# Patient Record
(demographics unavailable — no encounter records)

---

## 2024-10-21 NOTE — HISTORY OF PRESENT ILLNESS
[FreeTextEntry1] : PT with CTA : CAC: 37, minimal LAD dz, hfpef, DD2, mr, hld, mina on cpap, erectile dysfunction, IBS, prostate cancer to bone on lupron in past.  Ashtma, AORTIC ATHEROSCLEROSIS CT SCAN . 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   23:  23: CAROTID: B/L ICA < 50%.  10 year ASCVD: 17.5%  Patient denies cp, syncope or palpitations. Patient c/o sob going up stairs but has asthma and anxiety. Patient c/o dizziness with postural changes.  pt f/u for prostate CA and patient is depressed at times. pt has aortic atherosclerosis. pt having stress from wife. pt says at times has palpiations.  Sodium: 145 watch salt,  T  bnp: 48   24: Patient called office stating HR has been in 50s and isnt sure if he should take metoprolol 100mg for CTA. NP advised patient to half metoprolol to 50mg for CTA.   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 pt says sometimes if active feels hr faster but not bothersome.  pt did not to bloodwork.  pt did not start cholesterol rosuvastatin 10 mg po qhs.    10/10/24: EF: 68%, LVMI: 93 g/m2, ivsd/lvpw: 1.1 cm, GLS: -22.5%, E/e': 10 lvot vti: 29.4 cm. SoV: 3.9 cm,. borderline LVH, trace AR 10/21/24: Patient denies cp, sob, dizziness/syncope, or palpitations. The patient is getting blood work done today and states did not start Rosuvastatin.

## 2024-10-21 NOTE — DISCUSSION/SUMMARY
[FreeTextEntry1] : aortic atherosclerosis 2022  mild CAD  start Rosuvastatin 10 mg po qhs  start baby aspirin 81mg daily  pt with high sodium f/u next visit, watch salt  repeat labs hear healthy diet exercise and weight control discussed with patient  f/u in 6 months, blood work

## 2024-10-21 NOTE — CARDIOLOGY SUMMARY
[de-identified] : 11/27/23: nsr normal ecg  [de-identified] : 4/17/23: ECHO: lvef 61%, CO: 5.1 l/min, PN pattern MV inflow but normal pressures, E/e': 11.2

## 2024-10-21 NOTE — CARDIOLOGY SUMMARY
[de-identified] : 11/27/23: nsr normal ecg  [de-identified] : 4/17/23: ECHO: lvef 61%, CO: 5.1 l/min, PN pattern MV inflow but normal pressures, E/e': 11.2

## 2024-10-21 NOTE — PHYSICAL EXAM
[Normal Venous Pressure] : normal venous pressure [Normal S1, S2] : normal S1, S2 [Clear Lung Fields] : clear lung fields [Soft] : abdomen soft [No Edema] : no edema [de-identified] : RRR

## 2024-10-21 NOTE — PHYSICAL EXAM
[Normal Venous Pressure] : normal venous pressure [Normal S1, S2] : normal S1, S2 [Clear Lung Fields] : clear lung fields [Soft] : abdomen soft [No Edema] : no edema [de-identified] : RRR

## 2025-04-11 NOTE — DISCUSSION/SUMMARY
[FreeTextEntry1] : aortic atherosclerosis 2022  mild CAD  PATIENT IS NON-COMPLIANT WITH MEDICATIONS  pt with high sodium f/u next visit, watch salt  hear healthy diet exercise and weight control discussed with patient  Start Zetia 10mg  bloodwork/carotid/6 months

## 2025-04-11 NOTE — CARDIOLOGY SUMMARY
[de-identified] : 11/27/23: nsr normal ecg  [de-identified] : 4/17/23: ECHO: lvef 61%, CO: 5.1 l/min, PN pattern MV inflow but normal pressures, E/e': 11.2

## 2025-04-11 NOTE — PHYSICAL EXAM
[Normal Venous Pressure] : normal venous pressure [Normal S1, S2] : normal S1, S2 [Clear Lung Fields] : clear lung fields [Soft] : abdomen soft [No Edema] : no edema [de-identified] : RRR

## 2025-04-11 NOTE — CARDIOLOGY SUMMARY
[de-identified] : 11/27/23: nsr normal ecg  [de-identified] : 4/17/23: ECHO: lvef 61%, CO: 5.1 l/min, PN pattern MV inflow but normal pressures, E/e': 11.2

## 2025-04-11 NOTE — HISTORY OF PRESENT ILLNESS
[FreeTextEntry1] : PT with CTA : CAC: 37, minimal LAD dz, hfpef, DD2, mr, hld, mina on cpap, erectile dysfunction, IBS, prostate cancer to bone on lupron in past.  Ashtma, AORTIC ATHEROSCLEROSIS CT SCAN . 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   23:  23: CAROTID: B/L ICA < 50%.  10 year ASCVD: 17.5%  Patient denies cp, syncope or palpitations. Patient c/o sob going up stairs but has asthma and anxiety. Patient c/o dizziness with postural changes.  pt f/u for prostate CA and patient is depressed at times. pt has aortic atherosclerosis. pt having stress from wife. pt says at times has palpiations.  Sodium: 145 watch salt,  T  bnp: 48   24: Patient called office stating HR has been in 50s and isnt sure if he should take metoprolol 100mg for CTA. NP advised patient to half metoprolol to 50mg for CTA.   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 pt says sometimes if active feels hr faster but not bothersome.  pt did not to bloodwork.  pt did not start cholesterol rosuvastatin 10 mg po qhs.   10/10/24: EF: 68%, LVMI: 93 g/m2, ivsd/lvpw: 1.1 cm, GLS: -22.5%, E/e': 10 lvot vti: 29.4 cm. SoV: 3.9 cm,. borderline LVH, trace AR 10/21/24: Patient denies cp, sob, dizziness/syncope, or palpitations. The patient is getting blood work done today and states did not start Rosuvastatin.   25: 25: H/H: , HDL: 47, T, LDL: 92 The patient denies CP, bleeding, SOB at rest, PND, abdominal discomfort, LH/dizziness, BLE edema, palpitations, and syncope.

## 2025-04-11 NOTE — PHYSICAL EXAM
[Normal Venous Pressure] : normal venous pressure [Normal S1, S2] : normal S1, S2 [Clear Lung Fields] : clear lung fields [Soft] : abdomen soft [No Edema] : no edema [de-identified] : RRR